# Patient Record
Sex: MALE
[De-identification: names, ages, dates, MRNs, and addresses within clinical notes are randomized per-mention and may not be internally consistent; named-entity substitution may affect disease eponyms.]

---

## 2023-02-25 ENCOUNTER — HOSPITAL ENCOUNTER (EMERGENCY)
Dept: HOSPITAL 97 - ER | Age: 33
Discharge: HOME | End: 2023-02-25
Payer: SELF-PAY

## 2023-02-25 VITALS — TEMPERATURE: 98 F | SYSTOLIC BLOOD PRESSURE: 133 MMHG | DIASTOLIC BLOOD PRESSURE: 98 MMHG | OXYGEN SATURATION: 100 %

## 2023-02-25 DIAGNOSIS — Z23: ICD-10-CM

## 2023-02-25 DIAGNOSIS — S61.213A: Primary | ICD-10-CM

## 2023-02-25 PROCEDURE — 99284 EMERGENCY DEPT VISIT MOD MDM: CPT

## 2023-02-25 PROCEDURE — 0HQGXZZ REPAIR LEFT HAND SKIN, EXTERNAL APPROACH: ICD-10-PCS

## 2023-02-25 PROCEDURE — 90471 IMMUNIZATION ADMIN: CPT

## 2023-02-25 NOTE — EDPHYS
Physician Documentation                                                                           

 [facility]                                                                 

Name: Joaquin Jimenes                                                                               

Age: 32 yrs                                                                                       

Sex: Male                                                                                         

: 1990                                                                                   

MRN: [id number]                                                                                   

Arrival Date: 2023                                                                          

Time: 19:54                                                                                       

Account#: [id number]                                                                            

Bed 20                                                                                            

Private MD:                                                                                       

ED Physician Aayush Anderson                                                                       

HPI:                                                                                              

                                                                                             

20:01 This 32 yrs old  Male presents to ER via Unassigned with complaints of          bs3 

      Laceration To Hand.                                                                         

20:01 32-year-old male no significant past medical history presents with a laceration to the  bs3 

      dorsum of his left third digit he notes that he was slicing onions when he cut his          

      finger that happened just prior to arrival he denies any other injuries he does not         

      know the last time he got a tetanus shot no numbness, tingling or weakness.                 

                                                                                                  

Historical:                                                                                       

- Allergies:                                                                                      

20:12 No Known Allergies;                                                                     ll3 

- Home Meds:                                                                                      

20:12 None [Active];                                                                          ll3 

- PMHx:                                                                                           

20:12 None;                                                                                   ll3 

- PSHx:                                                                                           

20:12 None;                                                                                   ll3 

                                                                                                  

- Immunization history:: Client reports receiving the 2nd dose of the Covid vaccine.              

- Social history:: Smoking status: Patient reports the use of cigarette tobacco                   

  products, denies chronic smoking, but will smoke occasionally.                                  

                                                                                                  

                                                                                                  

ROS:                                                                                              

20:01 Constitutional: Negative for fever, chills                                              bs3 

20:01 All other systems are negative.                                                             

                                                                                                  

Exam:                                                                                             

20:01 Constitutional:  This is a well developed, well nourished patient who is awake, alert,  bs3 

      and in no acute distress. Head/Face:  Normocephalic, atraumatic. Chest/axilla:  Normal      

      chest wall appearance and motion.  Nontender with no deformity.  No lesions are             

      appreciated. Skin:  Warm, dry with normal turgor.  Normal color with no rashes, no          

      lesions, and no evidence of cellulitis. MS/ Extremity:  on lotus 3rd digit on the distal      

      posterior aspect he has a flap-like laceration approximately 1 cm no active bleeding        

      his distal sensation is intact Neuro:  Awake and alert, GCS 15, oriented to person,         

      place, time, and situation.  Cranial nerves II-XII grossly intact.  Motor strength 5/5      

      in all extremities.  Sensory grossly intact.                                                

                                                                                                  

Vital Signs:                                                                                      

20:09  / 98; Pulse 66; Resp 16; Temp 98.0(O); Pulse Ox 100% on R/A; Weight 88.9 kg (R); ll3 

      Height 5 ft. 9 in. (175.26 cm) (R); Pain 0/10;                                              

20:09 Body Mass Index 28.94 (88.90 kg, 175.26 cm)                                             ll3 

                                                                                                  

Laceration:                                                                                       

20:01 Wound Repair of 1cm ( 0.4in ) subcutaneous laceration to left hand. Distal              bs3 

      neuro/vascular/tendon intact. Anesthesia: Digital block administered with 4 mls of 1%       

      lidocaine. Wound prep: Copious irrigation. Skin closed using Dermabond. Dressed with        

      bandaid. Patient tolerated well.                                                            

                                                                                                  

MDM:                                                                                              

19:55 Patient medically screened.                                                             bs3 

20:01 Differential diagnosis: superficial laceration, Possible open fracture. Data reviewed:  bs3 

      vital signs, nurses notes. Independent interpretation of the following test(s) in the       

      Emergency Department X-Ray: My interpretation is no Foreign body as interpreted by          

      myself. ED course: Will update tetanus, digital block clear laceration and use              

      Dermabond versus suture.                                                                    

                                                                                                  

                                                                                             

20:00 Order name: Hand Left 3 View XRAY                                                       bs3 

                                                                                             

20:24 Order name: Dermabond; Complete Time: 20:32                                             bs3 

                                                                                                  

Administered Medications:                                                                         

20:20 Drug: Tetanus-Diphtheria Toxoid Adult 0.5 ml {: [employer] ([employer]). Exp: ha1 

      2023. Lot #: [id number]. } Route: IM; Site: right deltoid;                                 

20:45 Follow up: Response: No adverse reaction                                                ha1 

20:22 Drug: Lidocaine (1 %) 5 mg {Note: administered by Dr. Anderson.} Volume: 20 ml; Route:     ha1 

      Infiltration;                                                                               

20:45 Follow up: Response: No adverse reaction                                                ha1 

                                                                                                  

                                                                                                  

Disposition Summary:                                                                              

23 20:35                                                                                    

Discharge Ordered                                                                                 

      Location: Home                                                                          bs3 

      Problem: new                                                                            bs3 

      Symptoms: are resolved                                                                  bs3 

      Condition: Stable                                                                       bs3 

      Diagnosis                                                                                   

        - Laceration without foreign body of left middle finger without damage to nail        bs3 

      Followup:                                                                               bs3 

        - With: Private Physician                                                                  

        - When: As needed                                                                          

        - Reason:                                                                                  

      Discharge Instructions:                                                                     

        - Discharge Summary Sheet                                                             bs3 

        - Laceration Care, Adult, Easy-to-Read                                                bs3 

      Forms:                                                                                      

        - Medication Reconciliation Form                                                      bs3 

        - Thank You Letter                                                                    bs3 

        - Antibiotic Education                                                                bs3 

        - Prescription Opioid Use                                                             bs3 

Signatures:                                                                                       

Dispatcher [employer]                           Jah Jones RN                      RN   ll3                                                  

Xiomara Kelly RN                        RN   ha1                                                  

Aayush Anderson MD MD   bs3                                                  

                                                                                                  

**************************************************************************************************

## 2023-02-25 NOTE — RAD REPORT
EXAM DESCRIPTION:  RAD - Hand Left 3 View - 2/25/2023 8:24 pm

 

CLINICAL HISTORY:  PAIN

 

COMPARISON:  No comparisonsNo comparisons

 

FINDINGS/IMPRESSION:  No acute fracture. No malalignment. No significant focal degenerative changes.

## 2023-02-25 NOTE — ER
Nurse's Notes                                                                                     

 [facility]                                                                 

Name: Joaquin Jimenes                                                                               

Age: 32 yrs                                                                                       

Sex: Male                                                                                         

: 1990                                                                                   

MRN: [id number]                                                                                   

Arrival Date: 2023                                                                          

Time: 19:54                                                                                       

Account#: [id number]                                                                            

Bed 20                                                                                            

Private MD:                                                                                       

Diagnosis: Laceration without foreign body of left middle finger without damage to nail           

                                                                                                  

Presentation:                                                                                     

                                                                                             

20:09 Chief complaint: Patient states: States "I was cutting chicken and the knife slipped",  ll3 

      small laceration to left second finger noted, bleeding controlled, denies pain, states      

      the cut burns. Coronavirus screen: Vaccine status: Patient reports receiving the 2nd        

      dose of the covid vaccine. At this time, the client does not indicate any symptoms          

      associated with coronavirus-19. Ebola Screen: No symptoms or risks identified at this       

      time. Complicating Factors: There are no complicating factors for this patient. Initial     

      Sepsis Screen: Does the patient meet any 2 criteria? No. Patient's initial sepsis           

      screen is negative. Does the patient have a suspected source of infection? No.              

      Patient's initial sepsis screen is negative. Risk Assessment: Do you want to hurt           

      yourself or someone else? Patient reports no desire to harm self or others. Onset of        

      symptoms was 2023. Care prior to arrival: None.                                

20:09 Method Of Arrival: Ambulatory                                                           ll3 

20:09 Acuity: JONATHAN 3                                                                           ll3 

                                                                                                  

Historical:                                                                                       

- Allergies:                                                                                      

20:12 No Known Allergies;                                                                     ll3 

- Home Meds:                                                                                      

20:12 None [Active];                                                                          ll3 

- PMHx:                                                                                           

20:12 None;                                                                                   ll3 

- PSHx:                                                                                           

20:12 None;                                                                                   ll3 

                                                                                                  

- Immunization history:: Client reports receiving the 2nd dose of the Covid vaccine.              

- Social history:: Smoking status: Patient reports the use of cigarette tobacco                   

  products, denies chronic smoking, but will smoke occasionally.                                  

                                                                                                  

                                                                                                  

Screenin:02 [facility] ED Fall Risk Assessment (Adult) History of falling in the last 3 months,       ha1 

      including since admission No falls in past 3 months (0 pts) Confusion or Disorientation     

      No (0 pts) Intoxicated or Sedated No (0 pts) Impaired Gait No (0 pts) Mobility Assist       

      Device Used No (0 pt) Altered Elimination No (0 pt) Score/Fall Risk Level 0 - 2 = Low       

      Risk Oriented to surroundings, Maintained a safe environment. Abuse screen: Denies          

      threats or abuse. Denies injuries from another. Nutritional screening: No deficits          

      noted. Tuberculosis screening: No symptoms or risk factors identified.                      

                                                                                                  

Assessment:                                                                                       

20:02 General: Appears comfortable, Behavior is calm, cooperative. Pain: Complains of pain in ha1 

      dorsal aspect of middle phalanx of left middle finger Pain does not radiate. Neuro:         

      Level of Consciousness is awake, alert, obeys commands, Oriented to person, place,          

      time, situation. Cardiovascular: Patient's skin is warm and dry. Respiratory: Airway is     

      patent Respiratory effort is even, unlabored, Respiratory pattern is regular,               

      symmetrical. Musculoskeletal: Circulation, motion, and sensation intact. Range of           

      motion: intact in all extremities. Injury Description: Laceration sustained to dorsal       

      aspect of middle phalanx of left middle finger is clean, is bleeding no active bleeding     

      noted.                                                                                      

                                                                                                  

Vital Signs:                                                                                      

20:09  / 98; Pulse 66; Resp 16; Temp 98.0(O); Pulse Ox 100% on R/A; Weight 88.9 kg (R); ll3 

      Height 5 ft. 9 in. (175.26 cm) (R); Pain 0/10;                                              

20:09 Body Mass Index 28.94 (88.90 kg, 175.26 cm)                                             ll3 

                                                                                                  

ED Course:                                                                                        

19:54 Patient arrived in ED.                                                                  ja2 

19:55 Aayush Anderson MD is Attending Physician.                                              bs3 

20:03 Patient has correct armband on for positive identification. Bed in low position. Call   ha1 

      light in reach. Side rails up X 1.                                                          

20:09 Xiomara Kelly, RN is Primary Nurse.                                                      ha1 

20:12 Triage completed.                                                                       ll3 

20:12 Arm band placed on Patient placed in an exam room, on a stretcher, on pulse oximetry.   ll3 

20:26 Hand Left 3 View XRAY In Process Unspecified.                                           EDMS

20:42 Assist provider with laceration repair on left hand that was 2.5 cm. or less using      ha1 

      Dermabond. Set up tray. Performed by Aayush Anderson MD Dressed with band aid.                

20:45 Patient did not have IV access during this emergency room visit.                        ha1 

                                                                                                  

Administered Medications:                                                                         

20:20 Drug: Tetanus-Diphtheria Toxoid Adult 0.5 ml {: [employer] ([employer]). Exp: ha1 

      2023. Lot #: [id number]. } Route: IM; Site: right deltoid;                                 

20:45 Follow up: Response: No adverse reaction                                                ha1 

20:22 Drug: Lidocaine (1 %) 5 mg {Note: administered by Dr. Anderson.} Volume: 20 ml; Route:     ha1 

      Infiltration;                                                                               

20:45 Follow up: Response: No adverse reaction                                                ha1 

                                                                                                  

                                                                                                  

Medication:                                                                                       

20:32 Vaccine Information Statement (VIS) provided today. Questions and/or concerns           ha1 

      addressed. VIS edition date: 2023.                                             

                                                                                                  

Outcome:                                                                                          

20:35 Discharge ordered by MD.                                                                bs3 

20:44 Discharged to home ambulatory, with family.                                             ha1 

20:44 Condition: stable                                                                           

20:44 Discharge instructions given to patient, family, Instructed on discharge instructions,      

      follow up and referral plans. Demonstrated understanding of instructions, follow-up         

      care.                                                                                       

20:45 Patient left the ED.                                                                    ha1 

                                                                                                  

Signatures:                                                                                       

Dispatcher [employer]                           EDMS                                                 

Greta Orosco Lynsea, RN                      RN   3                                                  

Xiomara Kelly RN                        RN   ha1                                                  

Aayush Andreson MD MD   bs3                                                  

                                                                                                  

**************************************************************************************************